# Patient Record
Sex: MALE | Race: WHITE | Employment: OTHER | URBAN - METROPOLITAN AREA
[De-identification: names, ages, dates, MRNs, and addresses within clinical notes are randomized per-mention and may not be internally consistent; named-entity substitution may affect disease eponyms.]

---

## 2017-03-15 DIAGNOSIS — D52.8 OTHER FOLATE DEFICIENCY ANEMIAS: ICD-10-CM

## 2017-03-16 RX ORDER — FOLIC ACID 1 MG/1
1 TABLET ORAL DAILY
Qty: 30 TABLET | Refills: 0 | Status: SHIPPED | OUTPATIENT
Start: 2017-03-16

## 2017-03-16 NOTE — TELEPHONE ENCOUNTER
Medication is being filled for 1 time refill only due to:  Patient needs to be seen because needs to est care with a new provider.

## 2017-03-16 NOTE — TELEPHONE ENCOUNTER
Folic Acid 1MG Tablet      Last Written Prescription Date: 02/01/2016  Last Fill Quantity: 100,  # refills: 3   Last Office Visit with [facility], [facility] or [facility] prescribing provider: 10/31/2016

## 2017-04-17 DIAGNOSIS — D52.8 OTHER FOLATE DEFICIENCY ANEMIAS: ICD-10-CM

## 2017-04-17 RX ORDER — FOLIC ACID 1 MG/1
TABLET ORAL
Qty: 30 TABLET | Refills: 0 | OUTPATIENT
Start: 2017-04-17

## 2017-04-17 NOTE — TELEPHONE ENCOUNTER
Pending Prescriptions:                       Disp   Refills    folic acid (FOLVITE) 1 MG tablet [Pharmac*30 tab*0            Sig: TAKE 1 TABLET BY MOUTH ONCE DAILY        LAST OFFICE VISIT A YEAR AGO 4/20/2016      Last Written Prescription Date: 3/16/2017  Last Fill Quantity: 30,  # refills: 0   Last Office Visit with [facility], [facility] or [facility] prescribing provider: 4/20/2016Luciano

## 2017-04-17 NOTE — TELEPHONE ENCOUNTER
Pt. [facility]. Refill not needed.    Folic Acid      Last Written Prescription Date: 3/16/2017  Last Fill Quantity: 30,  # refills: 0   Last Office Visit with [facility], [facility] or [facility] prescribing provider: 4/20/2016     Routing refill request to provider for review/approval because:  Paola given x1 and patient did not follow up, please advise    Sandra Nye RN, BSN, PHN

## 2018-12-14 ENCOUNTER — HOSPITAL ENCOUNTER (EMERGENCY)
Facility: CLINIC | Age: 68
Discharge: HOME | End: 2018-12-14
Attending: EMERGENCY MEDICINE | Admitting: EMERGENCY MEDICINE
Payer: MEDICARE

## 2018-12-14 VITALS
TEMPERATURE: 97.7 F | HEIGHT: 71 IN | BODY MASS INDEX: 26.6 KG/M2 | SYSTOLIC BLOOD PRESSURE: 142 MMHG | DIASTOLIC BLOOD PRESSURE: 102 MMHG | HEART RATE: 89 BPM | WEIGHT: 190 LBS | RESPIRATION RATE: 14 BRPM | OXYGEN SATURATION: 91 %

## 2018-12-14 DIAGNOSIS — T78.3XXA ANGIOEDEMA, INITIAL ENCOUNTER: ICD-10-CM

## 2018-12-14 PROCEDURE — 96374 THER/PROPH/DIAG INJ IV PUSH: CPT

## 2018-12-14 PROCEDURE — 96375 TX/PRO/DX INJ NEW DRUG ADDON: CPT | Mod: 59

## 2018-12-14 PROCEDURE — 31575 DIAGNOSTIC LARYNGOSCOPY: CPT

## 2018-12-14 PROCEDURE — 96372 THER/PROPH/DIAG INJ SC/IM: CPT | Mod: 59

## 2018-12-14 PROCEDURE — 25000128 H RX IP 250 OP 636: Performed by: EMERGENCY MEDICINE

## 2018-12-14 PROCEDURE — 99284 EMERGENCY DEPT VISIT MOD MDM: CPT | Mod: 25

## 2018-12-14 RX ORDER — EPINEPHRINE 1 MG/ML
0.3 INJECTION, SOLUTION, CONCENTRATE INTRAVENOUS ONCE
Status: COMPLETED | OUTPATIENT
Start: 2018-12-14 | End: 2018-12-14

## 2018-12-14 RX ORDER — DIPHENHYDRAMINE HYDROCHLORIDE 50 MG/ML
25 INJECTION INTRAMUSCULAR; INTRAVENOUS ONCE
Status: COMPLETED | OUTPATIENT
Start: 2018-12-14 | End: 2018-12-14

## 2018-12-14 RX ORDER — LIDOCAINE HYDROCHLORIDE 20 MG/ML
INJECTION, SOLUTION INFILTRATION; PERINEURAL
Status: DISCONTINUED
Start: 2018-12-14 | End: 2018-12-14 | Stop reason: HOSPADM

## 2018-12-14 RX ORDER — METHYLPREDNISOLONE SODIUM SUCCINATE 125 MG/2ML
125 INJECTION, POWDER, LYOPHILIZED, FOR SOLUTION INTRAMUSCULAR; INTRAVENOUS ONCE
Status: COMPLETED | OUTPATIENT
Start: 2018-12-14 | End: 2018-12-14

## 2018-12-14 RX ORDER — OXYMETAZOLINE HYDROCHLORIDE 0.05 G/100ML
SPRAY NASAL
Status: DISCONTINUED
Start: 2018-12-14 | End: 2018-12-14 | Stop reason: HOSPADM

## 2018-12-14 RX ADMIN — RANITIDINE HYDROCHLORIDE 50 MG: 25 INJECTION INTRAMUSCULAR; INTRAVENOUS at 11:04

## 2018-12-14 RX ADMIN — DIPHENHYDRAMINE HYDROCHLORIDE 25 MG: 50 INJECTION, SOLUTION INTRAMUSCULAR; INTRAVENOUS at 11:03

## 2018-12-14 RX ADMIN — METHYLPREDNISOLONE SODIUM SUCCINATE 125 MG: 125 INJECTION, POWDER, FOR SOLUTION INTRAMUSCULAR; INTRAVENOUS at 11:01

## 2018-12-14 RX ADMIN — EPINEPHRINE 0.3 MG: 1 INJECTION INTRAMUSCULAR; INTRAVENOUS; SUBCUTANEOUS at 11:00

## 2018-12-14 ASSESSMENT — ENCOUNTER SYMPTOMS
FACIAL SWELLING: 1
SPEECH DIFFICULTY: 1
TROUBLE SWALLOWING: 1
SORE THROAT: 1
FEVER: 0
SHORTNESS OF BREATH: 0

## 2018-12-14 ASSESSMENT — MIFFLIN-ST. JEOR: SCORE: 1653.96

## 2018-12-14 NOTE — ED NOTES
Pt states no change in pain, and swelling. VSS. Skin pink warm and dry. Wife at bedside. Denies the need for pain medication.

## 2018-12-14 NOTE — ED PROVIDER NOTES
"  History     Chief Complaint:  Oral swelling    HPI   Doug Spence is a 68 year old male smoker who presents with oral swelling. The patient had a cataract surgery this morning and shortly afterwards around 0930 he started feeling like the right side of his tongue was swollen. The swelling has gotten progressively worse, and now he reports that it is difficult to swallow or talk. He also reports a sore throat. The patient is in no respiratory distress. He does not have a rash. The patient did not have a fever when it was measured prior to the surgery. He has no history of any oral swelling like this.     Allergies:  No known allergies     Medications:    Lisinopril  Viagra     Past Medical History:    HTN  Cataract  Tobacco use    Past Surgical History:    PE tubes  T & A  Removal of sperm ducts  Cataract surgery    Family History:    HTN  Diabetes    Social History:  Patient presents with wife  Current smoker   Positive for alcohol use.   Marital Status:       Review of Systems   Constitutional: Negative for fever.   HENT: Positive for facial swelling, sore throat and trouble swallowing.    Respiratory: Negative for shortness of breath.    Skin: Negative for rash.   Neurological: Positive for speech difficulty.   All other systems reviewed and are negative.    Physical Exam   First Vitals:  BP: (!) 171/103  Pulse: 89  Heart Rate: 90  Temp: 97.7  F (36.5  C)  Resp: 13  Height: 180.3 cm (5' 11\")  Weight: 86.2 kg (190 lb)  SpO2: 96 %      Physical Exam  Constitutional:  Appears well-developed and well-nourished. Alert. Conversant. Non toxic.  HENT:   Head: Atraumatic.   Nose: Nose normal.  Mouth/Throat: Significant swelling of his tongue, in particular on the right side with almost no swelling on the left.  He also says swelling of the submandibular tissue more predominant on the right than the left.  He is able to open his mouth to get about 3 cm between his incisors.  Unable to see the soft palate in " the very top of the peritonsillar pillars but not the bottom of the uvula or the tonsils.  He has difficulty speaking due to his tongue swelling.  No stridor or difficulty breathing.  Voice is not hoarse.  He is controlling his own secretions.  Eyes: Conjunctivae normal. EOM normal. Pupils equal, round, and reactive to light. No scleral icterus.   Neck: Normal range of motion. Neck supple. No tracheal deviation present.   Cardiovascular: Normal rate, regular rhythm. No gallop. No friction rub. No murmur heard. Symmetric radial artery pulses   Pulmonary/Chest: Effort normal. No stridor. No respiratory distress. No wheezes. No rales. No rhonchi . No tenderness.   Abdominal: Soft. Bowel sounds normal. No distension. No mass. No tenderness. No rebound. No guarding.   Musculoskeletal:   RUE: Normal range of motion. No tenderness. No deformity  LUE: Normal range of motion. No tenderness. No deformity  RLE: Normal range of motion. No edema. No tenderness. No deformity  LLE: Normal range of motion. No edema. No tenderness. No deformity  Lymph: No cervical adenopathy.   Neurological: Alert and oriented to person, place, and time. Normal strength. CN II-VII intact. No sensory deficit. GCS eye subscore is 4. GCS verbal subscore is 5. GCS motor subscore is 6. Normal coordination   Skin: Skin is warm and dry. No rash noted. No pallor. Normal capillary refill.  Psychiatric:  Normal mood. Normal affect.     Emergency Department Course   Procedures:  Procedure: Nasopharyngoscopy    sing fiberoptic flexible laryngoscope with video screen at bedside.  Indication: Angioedema with tongue swelling, assess for posterior airway swelling or laryngeal swelling  Procedure: Verbal consent was obtained from the patient and his wife.  I evaluated the patient's nostrils.  I felt that his right nostril was suitable.    Topical anesthesia with 2% lidocaine 3 mL's through mucosal at a moderate his device was instilled into the right nostril.   Topical vasoconstrictor with 0.05% Afrin 2 sprays into the right nostril.    Using the slim fiberoptic flexible scope we were able to move to the patient's right nostril.  It took 4 attempts to get a good view that was free of secretions.  We were able to visualize the patient's epiglottis which looked normal and the vocal cords which looked normal.    Patient tolerated the procedure well.    Interventions:  1100 - Epinephrine 0.3 mg IV  1101 - Solu-medrol 125 mg IV  1103 - Benadryl 25 mg IV  1104 - Zantac 50 mg IV    Emergency Department Course:  Nursing notes and vitals reviewed.  1055: I performed an exam of the patient as documented above.     1115: I rechecked the patient. I performed a Nasopharyngoscopy procedure (see procedure note)    1227: I rechecked the patient. Explained findings to patient.    1319: I rechecked the patient. He is starting to feel better and is objectively less swollen.     1400: I rechecked the patient. Findings and plan explained to the Patient. Patient discharged home with instructions regarding supportive care, medications, and reasons to return. The importance of close follow-up was reviewed.       Impression & Plan      Medical Decision Making:  Doug Spence is a 68 year old male who came to the ER today by private car for evaluation of tongue and some tubular swelling, more prominent on the right than on the left.  Swelling occurred while he was on his way home from [facility].  Initial presentation was most suggestive of angioedema of the tongue.  We considered allergic reaction but he had no other symptoms such as bronchospasm, hives, GI effects.  We did administer steroids, antihistamines, epinephrine in case there was some allergic component but he really did not have any change after those medications.  We did fiberoptic flexible nasal laryngoscopy to make sure there is no edema of his airway structures and they were  normal.  We monitor the patient carefully here in the ER with multiple rechecks.  He was stable for a couple of hours and then had slow improvement with decrease in the swelling.  He had objective decrease in swelling improvement in the appearance of his tongue no signs of any airway difficulty.  I discussed with the patient and his wife that it often takes hours to days for angioedema to resolve and we typically would admit some of the hospital for monitoring until symptoms are improved.  He is however feeling better and does not want to be hospitalized.  Therefore we will discharge home.  Clinically, this does seem to be that he is on the appropriate clinical track and expect progression to resolution.  However I reiterated the importance to return to the ER if he feels as though he is worsening at all.     We also discussed that the etiology of the angioedema is not clear but he is on lisinopril.  I recommend that he stop that until he can talk with his doctors.  He and his wife verbalized understanding and agree.  We will have him monitor his blood pressure at home and follow-up with his doctor to see if we should initiate an alternative antihypertensive.      Critical Care time:  0    Diagnosis:    ICD-10-CM    1. Angioedema, initial encounter T78.3XXA        Disposition:  discharged to home    IDaquan, am serving as a scribe on 12/14/2018 at 2:02 PM to personally document services performed by Jayden Haji MD based on my observations and the provider's statements to me.       Daquan Mendez  12/14/2018   [facility] EMERGENCY DEPARTMENT       Jayden Haji MD  12/14/18 7205

## 2018-12-14 NOTE — ED AVS SNAPSHOT
[facility] Emergency Department  [address]  [facility] [address]  Phone:  [phone]  Fax:  [phone]                                    Doug Spence   MRN: [id number]    Department:  [facility] Emergency Department   Date of Visit:  12/14/2018           After Visit Summary Signature Page    I have received my discharge instructions, and my questions have been answered. I have discussed any challenges I see with this plan with the nurse or doctor.    ..........................................................................................................................................  Patient/Patient Representative Signature      ..........................................................................................................................................  Patient Representative Print Name and Relationship to Patient    ..................................................               ................................................  Date                                   Time    ..........................................................................................................................................  Reviewed by Signature/Title    ...................................................              ..............................................  Date                                               Time          22EPIC Rev 08/18

## 2018-12-14 NOTE — DISCHARGE INSTRUCTIONS
Do not take lisinopril until you can talk to your doctor.  I suspect that this tongue swelling was a side effect of that blood pressure medication.    Monitor your blood pressure once daily and keep a record for your doctor so they can help start you on new blood pressure medications.    Continue your drops for your eye surgery as directed by your eye surgeon.    If you have any worsening swelling of your tongue, your chin, or any trouble breathing, or if you develop any rash or hives, to return to the ER immediately.

## 2019-03-22 ENCOUNTER — HEALTH MAINTENANCE LETTER (OUTPATIENT)
Age: 69
End: 2019-03-22

## 2019-09-27 ENCOUNTER — HEALTH MAINTENANCE LETTER (OUTPATIENT)
Age: 69
End: 2019-09-27

## 2019-11-11 ENCOUNTER — HOSPITAL ENCOUNTER (EMERGENCY)
Facility: CLINIC | Age: 69
Discharge: HOME | End: 2019-11-11
Attending: EMERGENCY MEDICINE | Admitting: EMERGENCY MEDICINE
Payer: COMMERCIAL

## 2019-11-11 ENCOUNTER — APPOINTMENT (OUTPATIENT)
Dept: CT IMAGING | Facility: CLINIC | Age: 69
End: 2019-11-11
Attending: EMERGENCY MEDICINE
Payer: COMMERCIAL

## 2019-11-11 VITALS
OXYGEN SATURATION: 96 % | DIASTOLIC BLOOD PRESSURE: 108 MMHG | RESPIRATION RATE: 18 BRPM | TEMPERATURE: 98.4 F | HEART RATE: 107 BPM | SYSTOLIC BLOOD PRESSURE: 167 MMHG

## 2019-11-11 DIAGNOSIS — S06.0X0A CONCUSSION WITHOUT LOSS OF CONSCIOUSNESS, INITIAL ENCOUNTER: ICD-10-CM

## 2019-11-11 DIAGNOSIS — R27.0 ATAXIA: ICD-10-CM

## 2019-11-11 DIAGNOSIS — F10.930 ALCOHOL WITHDRAWAL SYNDROME WITHOUT COMPLICATION (H): ICD-10-CM

## 2019-11-11 LAB
ALBUMIN SERPL-MCNC: 4.1 G/DL (ref 3.4–5)
ALP SERPL-CCNC: 69 U/L (ref 40–150)
ALT SERPL W P-5'-P-CCNC: 72 U/L (ref 0–70)
ANION GAP SERPL CALCULATED.3IONS-SCNC: 9 MMOL/L (ref 3–14)
AST SERPL W P-5'-P-CCNC: 76 U/L (ref 0–45)
BASOPHILS # BLD AUTO: 0.1 10E9/L (ref 0–0.2)
BASOPHILS NFR BLD AUTO: 0.8 %
BILIRUB SERPL-MCNC: 0.7 MG/DL (ref 0.2–1.3)
BUN SERPL-MCNC: 13 MG/DL (ref 7–30)
CALCIUM SERPL-MCNC: 9.4 MG/DL (ref 8.5–10.1)
CHLORIDE SERPL-SCNC: 101 MMOL/L (ref 94–109)
CO2 SERPL-SCNC: 26 MMOL/L (ref 20–32)
CREAT SERPL-MCNC: 0.82 MG/DL (ref 0.66–1.25)
DIFFERENTIAL METHOD BLD: ABNORMAL
EOSINOPHIL # BLD AUTO: 0.3 10E9/L (ref 0–0.7)
EOSINOPHIL NFR BLD AUTO: 3 %
ERYTHROCYTE [DISTWIDTH] IN BLOOD BY AUTOMATED COUNT: 14.4 % (ref 10–15)
ETHANOL SERPL-MCNC: <0.01 G/DL
GFR SERPL CREATININE-BSD FRML MDRD: 90 ML/MIN/{1.73_M2}
GLUCOSE SERPL-MCNC: 130 MG/DL (ref 70–99)
HCT VFR BLD AUTO: 46.7 % (ref 40–53)
HGB BLD-MCNC: 16.9 G/DL (ref 13.3–17.7)
IMM GRANULOCYTES # BLD: 0.1 10E9/L (ref 0–0.4)
IMM GRANULOCYTES NFR BLD: 0.6 %
INTERPRETATION ECG - MUSE: NORMAL
LIPASE SERPL-CCNC: 99 U/L (ref 73–393)
LYMPHOCYTES # BLD AUTO: 1.5 10E9/L (ref 0.8–5.3)
LYMPHOCYTES NFR BLD AUTO: 14.9 %
MAGNESIUM SERPL-MCNC: 1.7 MG/DL (ref 1.6–2.3)
MCH RBC QN AUTO: 38.1 PG (ref 26.5–33)
MCHC RBC AUTO-ENTMCNC: 36.2 G/DL (ref 31.5–36.5)
MCV RBC AUTO: 105 FL (ref 78–100)
MONOCYTES # BLD AUTO: 1.2 10E9/L (ref 0–1.3)
MONOCYTES NFR BLD AUTO: 12.7 %
NEUTROPHILS # BLD AUTO: 6.6 10E9/L (ref 1.6–8.3)
NEUTROPHILS NFR BLD AUTO: 68 %
NRBC # BLD AUTO: 0 10*3/UL
NRBC BLD AUTO-RTO: 0 /100
PLATELET # BLD AUTO: 203 10E9/L (ref 150–450)
POTASSIUM SERPL-SCNC: 3.2 MMOL/L (ref 3.4–5.3)
PROT SERPL-MCNC: 8.2 G/DL (ref 6.8–8.8)
RBC # BLD AUTO: 4.44 10E12/L (ref 4.4–5.9)
SODIUM SERPL-SCNC: 136 MMOL/L (ref 133–144)
WBC # BLD AUTO: 9.7 10E9/L (ref 4–11)

## 2019-11-11 PROCEDURE — 70450 CT HEAD/BRAIN W/O DYE: CPT

## 2019-11-11 PROCEDURE — 83735 ASSAY OF MAGNESIUM: CPT | Performed by: EMERGENCY MEDICINE

## 2019-11-11 PROCEDURE — 80320 DRUG SCREEN QUANTALCOHOLS: CPT | Performed by: EMERGENCY MEDICINE

## 2019-11-11 PROCEDURE — 80053 COMPREHEN METABOLIC PANEL: CPT | Performed by: EMERGENCY MEDICINE

## 2019-11-11 PROCEDURE — 85025 COMPLETE CBC W/AUTO DIFF WBC: CPT | Performed by: EMERGENCY MEDICINE

## 2019-11-11 PROCEDURE — 96374 THER/PROPH/DIAG INJ IV PUSH: CPT

## 2019-11-11 PROCEDURE — 93005 ELECTROCARDIOGRAM TRACING: CPT

## 2019-11-11 PROCEDURE — 25000128 H RX IP 250 OP 636: Performed by: EMERGENCY MEDICINE

## 2019-11-11 PROCEDURE — 99285 EMERGENCY DEPT VISIT HI MDM: CPT | Mod: 25

## 2019-11-11 PROCEDURE — 83690 ASSAY OF LIPASE: CPT | Performed by: EMERGENCY MEDICINE

## 2019-11-11 RX ORDER — CHLORDIAZEPOXIDE HYDROCHLORIDE 5 MG/1
10 CAPSULE, GELATIN COATED ORAL 3 TIMES DAILY PRN
Qty: 20 CAPSULE | Refills: 0 | Status: SHIPPED | OUTPATIENT
Start: 2019-11-11

## 2019-11-11 RX ORDER — ONDANSETRON 2 MG/ML
4 INJECTION INTRAMUSCULAR; INTRAVENOUS ONCE
Status: COMPLETED | OUTPATIENT
Start: 2019-11-11 | End: 2019-11-11

## 2019-11-11 RX ORDER — LORAZEPAM 2 MG/ML
1 INJECTION INTRAMUSCULAR ONCE
Status: DISCONTINUED | OUTPATIENT
Start: 2019-11-11 | End: 2019-11-11 | Stop reason: HOSPADM

## 2019-11-11 RX ORDER — ONDANSETRON 4 MG/1
4 TABLET, ORALLY DISINTEGRATING ORAL EVERY 6 HOURS PRN
Qty: 10 TABLET | Refills: 0 | Status: SHIPPED | OUTPATIENT
Start: 2019-11-11 | End: 2019-11-14

## 2019-11-11 RX ADMIN — ONDANSETRON HYDROCHLORIDE 4 MG: 2 INJECTION, SOLUTION INTRAMUSCULAR; INTRAVENOUS at 08:33

## 2019-11-11 ASSESSMENT — ENCOUNTER SYMPTOMS
DIZZINESS: 1
HEADACHES: 1
TREMORS: 1
NECK PAIN: 0

## 2019-11-11 NOTE — ED NOTES
Pt refusing MRI, requesting to go home. Pt ensures will follow up with primary care doctor. MD discussed importance of follow up with patient in presence of RN.
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
## 2019-11-11 NOTE — ED AVS SNAPSHOT
[facility] Emergency Department  [address]  [facility] [address]  Phone:  [phone]  Fax:  [phone]                                    Doug Spence   MRN: [id number]    Department:  [facility] Emergency Department   Date of Visit:  11/11/2019           After Visit Summary Signature Page    I have received my discharge instructions, and my questions have been answered. I have discussed any challenges I see with this plan with the nurse or doctor.    ..........................................................................................................................................  Patient/Patient Representative Signature      ..........................................................................................................................................  Patient Representative Print Name and Relationship to Patient    ..................................................               ................................................  Date                                   Time    ..........................................................................................................................................  Reviewed by Signature/Title    ...................................................              ..............................................  Date                                               Time          22EPIC Rev 08/18
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
## 2020-03-15 ENCOUNTER — HEALTH MAINTENANCE LETTER (OUTPATIENT)
Age: 70
End: 2020-03-15

## 2020-12-13 ENCOUNTER — HOSPITAL ENCOUNTER (EMERGENCY)
Facility: CLINIC | Age: 70
Discharge: HOME | End: 2020-12-13
Attending: EMERGENCY MEDICINE | Admitting: EMERGENCY MEDICINE
Payer: COMMERCIAL

## 2020-12-13 VITALS
DIASTOLIC BLOOD PRESSURE: 93 MMHG | RESPIRATION RATE: 16 BRPM | OXYGEN SATURATION: 99 % | TEMPERATURE: 98.6 F | HEART RATE: 105 BPM | SYSTOLIC BLOOD PRESSURE: 142 MMHG

## 2020-12-13 DIAGNOSIS — R33.9 URINARY RETENTION: ICD-10-CM

## 2020-12-13 DIAGNOSIS — D64.9 ANEMIA, UNSPECIFIED TYPE: ICD-10-CM

## 2020-12-13 DIAGNOSIS — E87.1 HYPONATREMIA: ICD-10-CM

## 2020-12-13 LAB
ALBUMIN UR-MCNC: NEGATIVE MG/DL
ANION GAP SERPL CALCULATED.3IONS-SCNC: 4 MMOL/L (ref 3–14)
APPEARANCE UR: CLEAR
BASOPHILS # BLD AUTO: 0 10E9/L (ref 0–0.2)
BASOPHILS NFR BLD AUTO: 0.6 %
BILIRUB UR QL STRIP: NEGATIVE
BUN SERPL-MCNC: 12 MG/DL (ref 7–30)
CALCIUM SERPL-MCNC: 9.5 MG/DL (ref 8.5–10.1)
CHLORIDE SERPL-SCNC: 94 MMOL/L (ref 94–109)
CO2 SERPL-SCNC: 33 MMOL/L (ref 20–32)
COLOR UR AUTO: NORMAL
CREAT SERPL-MCNC: 0.51 MG/DL (ref 0.66–1.25)
DIFFERENTIAL METHOD BLD: ABNORMAL
EOSINOPHIL # BLD AUTO: 0 10E9/L (ref 0–0.7)
EOSINOPHIL NFR BLD AUTO: 0.1 %
ERYTHROCYTE [DISTWIDTH] IN BLOOD BY AUTOMATED COUNT: 13.2 % (ref 10–15)
GFR SERPL CREATININE-BSD FRML MDRD: >90 ML/MIN/{1.73_M2}
GLUCOSE SERPL-MCNC: 126 MG/DL (ref 70–99)
GLUCOSE UR STRIP-MCNC: NEGATIVE MG/DL
HCT VFR BLD AUTO: 31.1 % (ref 40–53)
HGB BLD-MCNC: 11.4 G/DL (ref 13.3–17.7)
HGB UR QL STRIP: NEGATIVE
IMM GRANULOCYTES # BLD: 0.1 10E9/L (ref 0–0.4)
IMM GRANULOCYTES NFR BLD: 2 %
KETONES UR STRIP-MCNC: NEGATIVE MG/DL
LEUKOCYTE ESTERASE UR QL STRIP: NEGATIVE
LYMPHOCYTES # BLD AUTO: 0.5 10E9/L (ref 0.8–5.3)
LYMPHOCYTES NFR BLD AUTO: 7.8 %
MCH RBC QN AUTO: 41.5 PG (ref 26.5–33)
MCHC RBC AUTO-ENTMCNC: 36.7 G/DL (ref 31.5–36.5)
MCV RBC AUTO: 113 FL (ref 78–100)
MONOCYTES # BLD AUTO: 0.2 10E9/L (ref 0–1.3)
MONOCYTES NFR BLD AUTO: 3.2 %
NEUTROPHILS # BLD AUTO: 6 10E9/L (ref 1.6–8.3)
NEUTROPHILS NFR BLD AUTO: 86.3 %
NITRATE UR QL: NEGATIVE
NRBC # BLD AUTO: 0 10*3/UL
NRBC BLD AUTO-RTO: 0 /100
PH UR STRIP: 7 PH (ref 5–7)
PLATELET # BLD AUTO: 350 10E9/L (ref 150–450)
POTASSIUM SERPL-SCNC: 4.6 MMOL/L (ref 3.4–5.3)
RBC # BLD AUTO: 2.75 10E12/L (ref 4.4–5.9)
RBC #/AREA URNS AUTO: 2 /HPF (ref 0–2)
SODIUM SERPL-SCNC: 131 MMOL/L (ref 133–144)
SOURCE: NORMAL
SP GR UR STRIP: 1.01 (ref 1–1.03)
UROBILINOGEN UR STRIP-MCNC: NORMAL MG/DL (ref 0–2)
WBC # BLD AUTO: 7 10E9/L (ref 4–11)
WBC #/AREA URNS AUTO: 1 /HPF (ref 0–5)

## 2020-12-13 PROCEDURE — 81001 URINALYSIS AUTO W/SCOPE: CPT | Performed by: EMERGENCY MEDICINE

## 2020-12-13 PROCEDURE — 80048 BASIC METABOLIC PNL TOTAL CA: CPT | Performed by: EMERGENCY MEDICINE

## 2020-12-13 PROCEDURE — 36415 COLL VENOUS BLD VENIPUNCTURE: CPT | Performed by: EMERGENCY MEDICINE

## 2020-12-13 PROCEDURE — 85025 COMPLETE CBC W/AUTO DIFF WBC: CPT | Performed by: EMERGENCY MEDICINE

## 2020-12-13 PROCEDURE — 99283 EMERGENCY DEPT VISIT LOW MDM: CPT

## 2020-12-13 PROCEDURE — 51702 INSERT TEMP BLADDER CATH: CPT

## 2020-12-13 ASSESSMENT — ENCOUNTER SYMPTOMS
DIARRHEA: 1
VOMITING: 0
HEMATURIA: 0
FEVER: 0
DIFFICULTY URINATING: 1

## 2020-12-13 NOTE — ED PROVIDER NOTES
History     Chief Complaint:  Difficulty Urinating       The history is provided by the patient.      Doug Spence is a 70 year old male we a history of Hodgkin's lymphoma and hypertension who presents for evaluation of urinary retention since last night. The patient states that he has had urgency with difficulty urinating, being unable to urinate. He states that he was able to urinate well until last night. He notes no history of urinary issues and states that he does not have a urologist. The patient denies any fever, vomiting or hematuria. He notes no history of abdominal surgeries and is not currently anticoagulated. He notes no recent antibiotic use. He notes that he has had some diarrhea, which he states that this is from the medication he is taking for Hodgkin's lymphoma. He states that he is currently completing chemotherapy, however is unsure what medications he is currently taking. He notes that [facility]. He states that since then he has been weak and unsteady on his feet but notes no other concerns.     Allergies:  No Known Drug Allergies      Medications:    Librium  Folvite  Lisinopril  Viagra    Past Medical History:    Hypertension  Hodgkin's lymphoma    Past Surgical History:    Extracapsular cataract extraction with intraocular lens implant  Colonoscopy through stoma, diagnostic  PE tubes  Tonsillectomy and adenoidectomy  Removal of sperm ducts     Family History:    Hypertension    Social History:  The patient presents to the ED alone.  PCP: [facility]     Review of Systems   Constitutional: Negative for fever.   Gastrointestinal: Positive for diarrhea. Negative for vomiting.   Genitourinary: Positive for decreased urine volume, difficulty urinating and urgency. Negative for hematuria.   All other systems reviewed and are negative.    Physical Exam     Patient Vitals for the past 24 hrs:    BP Temp Pulse Resp SpO2   12/13/20 1608 (!) 142/93 98.6  F (37  C) 105 16 98 %       Physical Exam  General: Sitting up in bed  Eyes:  The pupils are equal and round    Conjunctivae and sclerae are normal  ENT:    Wearing a mask  Neck:  Normal range of motion  CV:  Tachycardic rate, regular rhythm    Skin warm and well perfused   Resp:  Non labored breathing on room air    No tachypnea    No cough heard  GI:  Abdomen is soft, there is no rigidity    No distension    No rebound tenderness     Tender on suprapubic area  MS:  Normal muscular tone  Skin:  No rash or acute skin lesions noted  Neuro:   Awake, alert.      Speech is normal and fluent.    Face is symmetric.     Moves all extremities equally  Psych: Normal affect.  Appropriate interactions.    Emergency Department Course     Laboratory:  Laboratory findings were communicated with the patient who voiced understanding of the findings.    CBC: WBC: 7.0, HGB: 11.4 (low), PLT: 350    BMP: Glucose 126 (high), Sodium 131 (low), CO2 33 (low), Creatinine 0.51 (low) o/w WNL     UA with Microscopic: Negative     Emergency Department Course:  Past medical records, nursing notes, and vitals reviewed.    1616 I performed an exam of the patient as documented above.     IV was inserted and blood was drawn for laboratory testing, results above.    The patient provided a urine sample here in the emergency department. This was sent for laboratory testing, findings above.    1700 Nursing staff informed me that the patient urinated 1,400mL after catheter placement. The patient feels improved at this time, per nursing staff.     1800 I rechecked the patient and discussed the results of his workup thus far. Patient feels much better    Findings and plan explained to the patient. Patient discharged home with instructions regarding supportive care, medications, and reasons to return. The importance of close follow-up was reviewed.     I personally reviewed the laboratory results with  the patient and answered all related questions prior to discharge.     Impression & Plan     Medical Decision Making:  Doug Spence is a 70 year old male with a history of Hodgkin's lymphoma who presents for evaluation of decreased urinary output.  I considered a broad differential including diverticulitis, aneurysm, urinary retention, ureterolithiasis, UTI, pyelonephritis, neurologic causes (MS, cauda equina,etc), colitis, etc.  The history and exam are consistent with acute urinary retention and this is confirmed after ramírez catheter placement. The patient released 1,4000mL of urine after ramírez catheter placement and felt improved after this. A urinalysis was obtained and was negative. Has mild hyponatremia and mild anemia which was discussed with patient. Will send the patient home with catheter and have patient followup with urology. [facility] - he will talk to his doctor about getting into urology.  The cause of the acute urinary retention is unclear at this point and considered that this may be caused by current medications, prostate issues, bladder or urethral tumor, etc.  The patient understood the plan for urology follow up and return precautions and was discharged home in good condition. All questions answered.    Diagnosis:    ICD-10-CM    1. Urinary retention  R33.9 UA with Microscopic     Basic metabolic panel     CBC with platelets differential   2. Hyponatremia  E87.1    3. Anemia, unspecified type  D64.9        Disposition:  Discharged to home.    Scribe Disclosure:  I, Prasanth Howard, am serving as a scribe at 4:16 PM on 12/13/2020 to document services personally performed by Iva Flores MD based on my observations and the provider's statements to me.      Iva Flores MD  12/13/20 2924

## 2020-12-13 NOTE — ED AVS SNAPSHOT
[facility] Emergency Dept  [address]  [facility] [address]  Phone: [phone]  Fax: [phone]                                    Doug Spence   MRN: [id number]    Department: [facility] Emergency Dept   Date of Visit: 12/13/2020           After Visit Summary Signature Page    I have received my discharge instructions, and my questions have been answered. I have discussed any challenges I see with this plan with the nurse or doctor.    ..........................................................................................................................................  Patient/Patient Representative Signature      ..........................................................................................................................................  Patient Representative Print Name and Relationship to Patient    ..................................................               ................................................  Date                                   Time    ..........................................................................................................................................  Reviewed by Signature/Title    ...................................................              ..............................................  Date                                               Time          22EPIC Rev 08/18

## 2021-02-17 ENCOUNTER — HOSPITAL ENCOUNTER (EMERGENCY)
Facility: CLINIC | Age: 71
Discharge: HOME | End: 2021-02-17
Attending: EMERGENCY MEDICINE | Admitting: EMERGENCY MEDICINE
Payer: COMMERCIAL

## 2021-02-17 VITALS
BODY MASS INDEX: 24.5 KG/M2 | DIASTOLIC BLOOD PRESSURE: 90 MMHG | SYSTOLIC BLOOD PRESSURE: 140 MMHG | TEMPERATURE: 97.2 F | OXYGEN SATURATION: 96 % | HEART RATE: 84 BPM | RESPIRATION RATE: 16 BRPM | WEIGHT: 175 LBS | HEIGHT: 71 IN

## 2021-02-17 DIAGNOSIS — E86.0 DEHYDRATION: ICD-10-CM

## 2021-02-17 DIAGNOSIS — T83.511A URINARY TRACT INFECTION ASSOCIATED WITH INDWELLING URETHRAL CATHETER, INITIAL ENCOUNTER (H): ICD-10-CM

## 2021-02-17 DIAGNOSIS — N48.89 PENILE PAIN: ICD-10-CM

## 2021-02-17 DIAGNOSIS — N39.0 URINARY TRACT INFECTION ASSOCIATED WITH INDWELLING URETHRAL CATHETER, INITIAL ENCOUNTER (H): ICD-10-CM

## 2021-02-17 LAB
ALBUMIN SERPL-MCNC: 3 G/DL (ref 3.4–5)
ALBUMIN UR-MCNC: 100 MG/DL
ALP SERPL-CCNC: 75 U/L (ref 40–150)
ALT SERPL W P-5'-P-CCNC: 73 U/L (ref 0–70)
ANION GAP SERPL CALCULATED.3IONS-SCNC: 9 MMOL/L (ref 3–14)
APPEARANCE UR: ABNORMAL
AST SERPL W P-5'-P-CCNC: 35 U/L (ref 0–45)
BACTERIA #/AREA URNS HPF: ABNORMAL /HPF
BASOPHILS # BLD AUTO: 0 10E9/L (ref 0–0.2)
BASOPHILS NFR BLD AUTO: 0.5 %
BILIRUB SERPL-MCNC: 0.5 MG/DL (ref 0.2–1.3)
BILIRUB UR QL STRIP: NEGATIVE
BUN SERPL-MCNC: 14 MG/DL (ref 7–30)
CALCIUM SERPL-MCNC: 9.1 MG/DL (ref 8.5–10.1)
CHLORIDE SERPL-SCNC: 99 MMOL/L (ref 94–109)
CO2 SERPL-SCNC: 25 MMOL/L (ref 20–32)
COLOR UR AUTO: YELLOW
CREAT SERPL-MCNC: 0.7 MG/DL (ref 0.66–1.25)
DIFFERENTIAL METHOD BLD: ABNORMAL
EOSINOPHIL # BLD AUTO: 0 10E9/L (ref 0–0.7)
EOSINOPHIL NFR BLD AUTO: 0.5 %
ERYTHROCYTE [DISTWIDTH] IN BLOOD BY AUTOMATED COUNT: 15.9 % (ref 10–15)
GFR SERPL CREATININE-BSD FRML MDRD: >90 ML/MIN/{1.73_M2}
GLUCOSE SERPL-MCNC: 108 MG/DL (ref 70–99)
GLUCOSE UR STRIP-MCNC: NEGATIVE MG/DL
HCT VFR BLD AUTO: 29.2 % (ref 40–53)
HGB BLD-MCNC: 10.6 G/DL (ref 13.3–17.7)
HGB UR QL STRIP: ABNORMAL
HYALINE CASTS #/AREA URNS LPF: 20 /LPF (ref 0–2)
IMM GRANULOCYTES # BLD: 0.1 10E9/L (ref 0–0.4)
IMM GRANULOCYTES NFR BLD: 1 %
INTERPRETATION ECG - MUSE: NORMAL
KETONES UR STRIP-MCNC: NEGATIVE MG/DL
LACTATE BLD-SCNC: 2.2 MMOL/L (ref 0.7–2)
LACTATE BLD-SCNC: 3.2 MMOL/L (ref 0.7–2)
LEUKOCYTE ESTERASE UR QL STRIP: ABNORMAL
LYMPHOCYTES # BLD AUTO: 0.8 10E9/L (ref 0.8–5.3)
LYMPHOCYTES NFR BLD AUTO: 12.6 %
MCH RBC QN AUTO: 41.1 PG (ref 26.5–33)
MCHC RBC AUTO-ENTMCNC: 36.3 G/DL (ref 31.5–36.5)
MCV RBC AUTO: 113 FL (ref 78–100)
MONOCYTES # BLD AUTO: 0.7 10E9/L (ref 0–1.3)
MONOCYTES NFR BLD AUTO: 10.6 %
MUCOUS THREADS #/AREA URNS LPF: PRESENT /LPF
NEUTROPHILS # BLD AUTO: 4.6 10E9/L (ref 1.6–8.3)
NEUTROPHILS NFR BLD AUTO: 74.8 %
NITRATE UR QL: POSITIVE
NRBC # BLD AUTO: 0 10*3/UL
NRBC BLD AUTO-RTO: 0 /100
PH UR STRIP: 7 PH (ref 5–7)
PLATELET # BLD AUTO: 224 10E9/L (ref 150–450)
POTASSIUM SERPL-SCNC: 3.8 MMOL/L (ref 3.4–5.3)
PROT SERPL-MCNC: 6.4 G/DL (ref 6.8–8.8)
RBC # BLD AUTO: 2.58 10E12/L (ref 4.4–5.9)
RBC #/AREA URNS AUTO: 69 /HPF (ref 0–2)
SODIUM SERPL-SCNC: 133 MMOL/L (ref 133–144)
SOURCE: ABNORMAL
SP GR UR STRIP: 1.01 (ref 1–1.03)
SQUAMOUS #/AREA URNS AUTO: 1 /HPF (ref 0–1)
UROBILINOGEN UR STRIP-MCNC: NORMAL MG/DL (ref 0–2)
WBC # BLD AUTO: 6.1 10E9/L (ref 4–11)
WBC #/AREA URNS AUTO: >182 /HPF (ref 0–5)
WBC CLUMPS #/AREA URNS HPF: PRESENT /HPF

## 2021-02-17 PROCEDURE — 93005 ELECTROCARDIOGRAM TRACING: CPT

## 2021-02-17 PROCEDURE — 96360 HYDRATION IV INFUSION INIT: CPT

## 2021-02-17 PROCEDURE — 80053 COMPREHEN METABOLIC PANEL: CPT | Performed by: EMERGENCY MEDICINE

## 2021-02-17 PROCEDURE — 250N000013 HC RX MED GY IP 250 OP 250 PS 637: Mod: GY | Performed by: EMERGENCY MEDICINE

## 2021-02-17 PROCEDURE — 85025 COMPLETE CBC W/AUTO DIFF WBC: CPT | Performed by: EMERGENCY MEDICINE

## 2021-02-17 PROCEDURE — 87186 SC STD MICRODIL/AGAR DIL: CPT | Performed by: EMERGENCY MEDICINE

## 2021-02-17 PROCEDURE — 258N000003 HC RX IP 258 OP 636: Performed by: EMERGENCY MEDICINE

## 2021-02-17 PROCEDURE — 87086 URINE CULTURE/COLONY COUNT: CPT | Performed by: EMERGENCY MEDICINE

## 2021-02-17 PROCEDURE — 96361 HYDRATE IV INFUSION ADD-ON: CPT

## 2021-02-17 PROCEDURE — 99284 EMERGENCY DEPT VISIT MOD MDM: CPT | Mod: 25

## 2021-02-17 PROCEDURE — 83605 ASSAY OF LACTIC ACID: CPT | Mod: 91 | Performed by: EMERGENCY MEDICINE

## 2021-02-17 PROCEDURE — 81001 URINALYSIS AUTO W/SCOPE: CPT | Performed by: EMERGENCY MEDICINE

## 2021-02-17 PROCEDURE — 87088 URINE BACTERIA CULTURE: CPT | Performed by: EMERGENCY MEDICINE

## 2021-02-17 RX ORDER — LIDOCAINE HYDROCHLORIDE 20 MG/ML
20 JELLY TOPICAL
Status: DISCONTINUED | OUTPATIENT
Start: 2021-02-17 | End: 2021-02-17 | Stop reason: HOSPADM

## 2021-02-17 RX ORDER — CIPROFLOXACIN 500 MG/1
500 TABLET, FILM COATED ORAL ONCE
Status: COMPLETED | OUTPATIENT
Start: 2021-02-17 | End: 2021-02-17

## 2021-02-17 RX ORDER — CIPROFLOXACIN 500 MG/1
500 TABLET, FILM COATED ORAL 2 TIMES DAILY
Qty: 10 TABLET | Refills: 0 | Status: SHIPPED | OUTPATIENT
Start: 2021-02-17 | End: 2021-02-22

## 2021-02-17 RX ORDER — SODIUM CHLORIDE 9 MG/ML
INJECTION, SOLUTION INTRAVENOUS CONTINUOUS
Status: DISCONTINUED | OUTPATIENT
Start: 2021-02-17 | End: 2021-02-17 | Stop reason: HOSPADM

## 2021-02-17 RX ADMIN — CIPROFLOXACIN HYDROCHLORIDE 500 MG: 500 TABLET, FILM COATED ORAL at 13:56

## 2021-02-17 RX ADMIN — SODIUM CHLORIDE 1000 ML: 9 INJECTION, SOLUTION INTRAVENOUS at 10:54

## 2021-02-17 RX ADMIN — SODIUM CHLORIDE 500 ML: 9 INJECTION, SOLUTION INTRAVENOUS at 12:15

## 2021-02-17 ASSESSMENT — ENCOUNTER SYMPTOMS
ABDOMINAL PAIN: 1
HEMATURIA: 1
BRUISES/BLEEDS EASILY: 1
SHORTNESS OF BREATH: 0
BACK PAIN: 0
APPETITE CHANGE: 1
BLOOD IN STOOL: 0
NAUSEA: 1
FEVER: 0
DIZZINESS: 1
LIGHT-HEADEDNESS: 1

## 2021-02-17 ASSESSMENT — MIFFLIN-ST. JEOR: SCORE: 1575.92

## 2021-02-17 NOTE — ED NOTES
Patient got up and ambulated. Patient states he usually does not use a walker or cane but her normally does have a brace for his right leg which is currently not here. Patient states he feels improved after the fluids. Patient's wife states she normally helps will hold on to patient while he is ambulating. MD made aware.

## 2021-02-17 NOTE — ED TRIAGE NOTES
Pt c/o blood in his urine. Pt had urinary retention 12/20 and a ramírez was placed. [facility]. Pt has an appointment with urology tomorrow. C/o burning pain in penis. States urine output is the same as it has been.

## 2021-02-17 NOTE — DISCHARGE INSTRUCTIONS
Discharge Instructions  Urinary Tract Infection  You have been diagnosed with a likely urinary tract infection, or UTI. The urinary tract includes the kidneys (which make urine/pee), ureters (the tubes that carry urine/pee from the kidneys to the bladder), the bladder (which stores urine/pee), and urethra (the tube that carries urine/pee out of the bladder). Urinary tract infections occur when bacteria travel up the urethra into the bladder (bladder infection) and, in some cases, from there into the kidneys (kidney infection).  Generally, every Emergency Department visit should have a follow-up clinic visit with either a primary or a specialty clinic/provider. Please follow-up as instructed by your emergency provider today.  Return to the Emergency Department if:  You or your child have severe back pain.  You or your child are vomiting (throwing up) so that you cannot take your medicine.  You or your child have a new fever (had not previously had a fever) over 101 F.  You or your child have confusion or are very weak, or feel very ill.  Your child seems much more ill, will not wake up, will not respond right, or is crying for a long time and will not calm down.  You or your child are showing signs of dehydration. These signs may include decreased urination (pee), dry mouth/gums/tongue, or decreased activity.    Follow-up with your provider:   Children under 24 months need to be seen by their regular provider within one week after a diagnosis of a UTI. It may be necessary to do some more tests to look at the child s kidney or bladder.  You should begin to feel better within 24 - 48 hours of starting your antibiotic; follow-up with your regular clinic/doctor/provider if this is not the case.    Treatment:   You will be treated with an antibiotic to kill the bacteria. We have to make an educated guess, based on what we know about common bacteria and antibiotics, as to which antibiotic will work for your infection. We  "will be correct most times but there will be some cases where the antibiotic chosen is not correct (see urine cultures below).  Take a pain medication such as acetaminophen (Tylenol ) or ibuprofen (Advil , Motrin , Nuprin ).  Phenazopyridine (Pyridium , Uristat ) is a prescription medication that numbs the bladder to reduce the burning pain of some UTIs.  The same medication is available in a non-prescription version (Azo-Standard , Urodol ). This medication will change the color of the urine and tears (usually blue or orange). If you wear contacts, do not wear them while taking this medication as they may be stained by the medication.    Urine Cultures:  If indicated, a urine culture may have been performed today. This test generally takes 24-48 hours to complete so the results are not known at this time. The results can confirm that an infection is present but also determine which antibiotic is effective for the specific bacteria that is causing the infection. If your urine culture shows that the antibiotic you were given today will not work to treat your infection, we will attempt to contact you to make arrangements to change the antibiotic. If the culture confirms that the antibiotic is effective for your infection, you will not be contacted. We often recommend follow-up with your regular physician/provider on the culture results regardless of this process.    Antibiotic Warning:   If you have been placed on antibiotics - watch for signs of allergic reaction.  These include rash, lip swelling, difficulty breathing, wheezing, and dizziness.  If you develop any of these symptoms, stop the antibiotic immediately and go to an emergency room or urgent care for evaluation.    Probiotics: If you have been given an antibiotic, you may want to also take a probiotic pill or eat yogurt with live cultures. Probiotics have \"good bacteria\" to help your intestines stay healthy. Studies have shown that probiotics help prevent " diarrhea and other intestine problems (including C. diff infection) when you take antibiotics. You can buy these without a prescription in the pharmacy section of the store.   If you were given a prescription for medicine here today, be sure to read all of the information (including the package insert) that comes with your prescription.  This will include important information about the medicine, its side effects, and any warnings that you need to know about.  The pharmacist who fills the prescription can provide more information and answer questions you may have about the medicine.  If you have questions or concerns that the pharmacist cannot address, please call or return to the Emergency Department.   Remember that you can always come back to the Emergency Department if you are not able to see your regular provider in the amount of time listed above, if you get any new symptoms, or if there is anything that worries you.

## 2021-02-17 NOTE — ED PROVIDER NOTES
"  History   Chief Complaint:  Hematuria       The history is provided by the patient.      Doug Spence is a 70 year old male with history of hypertension who presents with Hematuria. States pain in his penis has progressed since his ramírez catheter was placed (12/12/20) . His ramírez was changed once in the ending of January. He notes recently he had to \"fix it\" by taping it on his right thigh. He notes he thought he saw blood in the bag earlier, as it was darker and reddish, but denies clots. He does not he bruises easily but denies any other abnormal bleeding. He notes slight abdominal pain over his bladder, lightheadedness, nausea , dizziness, and diarrhea since taking vitamin supplements . He has no appetite or thirst, reporting that he has really not had anything to eat or drink over the last few days. He notes he has not slept well either. Denies chest pain, shortness of breath, fever, or back pain, or black and bloody stools. He [facility] and wanted to go to the ED there today but his wife didn't want to drive further.     Review of Systems   Constitutional: Positive for appetite change. Negative for fever.   Respiratory: Negative for shortness of breath.    Cardiovascular: Positive for leg swelling. Negative for chest pain.   Gastrointestinal: Positive for abdominal pain and nausea. Negative for blood in stool.        No bloody stool.   Genitourinary: Positive for hematuria.   Musculoskeletal: Negative for back pain.   Neurological: Positive for dizziness and light-headedness.   Hematological: Bruises/bleeds easily.   All other systems reviewed and are negative.      Allergies:  No known allergies    Medications:  Viagra  Librium   Cyanoblamin    Folvite  Lisinopril     Past Medical History:    Hypertension   Nonsenile cataract      Past Surgical History:    Extracapsular cataract Extraction with intralocular lens implant  HC create eardrum opening  Remove " "tonsils/adenoids  Removal of septum ducts     Family History:    Mother: hypertension     Social History:  PCP:  [place]  Presents with significant other.    Physical Exam     Patient Vitals for the past 24 hrs:   BP Temp Temp src Pulse Resp SpO2 Height Weight   02/17/21 1345 -- -- -- 84 -- 96 % -- --   02/17/21 1330 (!) 140/90 -- -- 81 -- 96 % -- --   02/17/21 1315 (!) 137/92 -- -- 84 -- 96 % -- --   02/17/21 1300 (!) 162/122 -- -- 85 -- 97 % -- --   02/17/21 1245 (!) 146/89 -- -- 79 -- 97 % -- --   02/17/21 1230 (!) 146/87 -- -- 80 -- 95 % -- --   02/17/21 1215 (!) 143/87 -- -- 84 -- -- -- --   02/17/21 1210 -- -- -- 75 -- -- -- --   02/17/21 1200 -- -- -- 69 -- 98 % -- --   02/17/21 1150 129/64 -- -- 79 -- 99 % -- --   02/17/21 1145 -- -- -- 78 -- 99 % -- --   02/17/21 1140 -- -- -- 75 -- 100 % -- --   02/17/21 1130 119/83 -- -- 76 -- 98 % -- --   02/17/21 1120 138/85 -- -- 71 -- 98 % -- --   02/17/21 1115 138/85 -- -- 74 -- 99 % -- --   02/17/21 1110 -- -- -- 70 -- 98 % -- --   02/17/21 1100 -- -- -- -- -- 100 % -- --   02/17/21 1045 -- -- -- -- -- 98 % -- --   02/17/21 1040 -- -- -- -- -- 97 % -- --   02/17/21 1030 121/81 -- -- 84 -- 100 % -- --   02/17/21 1009 (!) 85/53 97.2  F (36.2  C) Temporal 104 16 100 % 1.803 m (5' 11\") 79.4 kg (175 lb)       Physical Exam  General: Elderly male sitting upright  Eyes: PERRL, Conjunctive within normal limits  ENT: Moist mucous membranes, oropharynx clear.   CV: Normal S1S2, no murmur, rub or gallop. Tachycardic, regular.   Resp: Clear to auscultation bilaterally, no wheezes, rales or rhonchi. Normal respiratory effort.  GI: Abdomen is soft and nondistended. Mild suprapubic tenderness to palpation. No palpable masses. No rebound or guarding. No CVA tenderness to percussion.  : Curry catheter in place, held taut by taping onto the leg with, tugging at the urethral meatus. No visible blood or discharge at the urethral meatus, which is mildly " erythematous but without skin breakdown. Dark yellow urine in tubing and bag. No gross hematuria.   MSK: No edema. Nontender. Normal active range of motion.  Skin: Warm and dry. No rashes or lesions or ecchymoses on visible skin.  Neuro: Alert and oriented. Responds appropriately to all questions and commands. No focal findings appreciated. Normal muscle tone.  Psych: Normal mood and affect.     Emergency Department Course     ECG:  ECG taken at 1057, ECG read at 1107  Unusual P axis, possible ectopic atrial rhythm With premature atrial complexes  Abnormal  ECG  Rate 76 bpm. NC interval 158 ms. QRS duration 94 ms. QT/QTc 402/452 ms. P-R-T axes 148 28 26.    Laboratory:  CBC: WBC 6.1, HGB 10.6(L),    CMP: Glucose  108(H), Albumin  3.0(L), protein 6.4(L), ALT 73(H), Creatinine 0.70 o/w WNL  Lactic acid whole blood(1104) 3.2(H)  UA with microscopic: blood moderate, protein Albumin 100, nitrate positive, leukocyte esterase  Large, WBC/HPF  >182(H), RBC/HPF  69(H), WBC clumps present, bacteria many, mucosus present, hyaline casts 20(H)  o/w WNL    Urine Culture Aerobic Bacterial: pending  Lactic acid whole blood(1104) 3.2(H)  Lactic acid whole blood(1155) 2.2(H)    Emergency Department Course:    Reviewed:  I reviewed nursing notes, vitals and past medical history    Assessments:  1016 I obtained history and examined the patient as noted above.   1041 I rechecked the patient and explained findings.   1326 I returned to recheck the patient and explain findings.     Interventions:  1054 NS, 1 L, IV  1215 NS, 500mL, IV    Disposition:  The patient was discharged to home.     Impression & Plan       Medical Decision Making:  Doug Spence is a 70-year-old male with recent urinary retention status post placement of Curry catheter which is still in place with concerns for blood noted in the urine.  Is not clear if it is actually blood or just concentrated urine versus UTI.  He was having burning at the urethral  meatus urethra.  The Curry catheter was tugging strongly on the penis when I evaluated the patient suggesting this might be irritative.  I also considered Curry catheter associated UTI.  He was tachycardic and had one initial low blood pressure measurement in triage.  He had no further low blood pressure measurements.  His tachycardia improved with IV fluids.  His symptoms of dizziness and weakness improved with IV fluids.  He was still feeling tired and fatigued but had no fever, is nontoxic in appearance and I do not suspect systemic infection at this time.  His lactic acid was elevated, improved with IV fluids, I suspect secondary to dehydration given his history.  It is possible the urinary changes are secondary to contamination rather than true UTI however given the overall clinical picture we will treat him with ciprofloxacin while awaiting urology follow-up tomorrow.  He felt comfortable this plan.  He was somewhat shaky on ambulation trial but noted that that he was tired and that was somewhat his baseline as did his wife.  She will be assisting him at home.  He is cautioned guarding falls.  He declined a cane or walker here.  He is recommend return immediately to the emergency department should his symptoms worsen.  He felt comfortable with plan for discharge home.      Diagnosis:    ICD-10-CM    1. Penile pain  N48.89    2. Urinary tract infection associated with indwelling urethral catheter, initial encounter (H)  T83.511A     N39.0    3. Dehydration  E86.0        Discharge Medications:  New Prescriptions    CIPROFLOXACIN (CIPRO) 500 MG TABLET    Take 1 tablet (500 mg) by mouth 2 times daily for 5 days       Scribe Disclosure:  I, Biankamouna Arroyo, am serving as a scribe at 10:21 AM on 2/17/2021 to document services personally performed by Nina Cooper based on my observations and the provider's statements to me.        Nina Cooper MD  02/17/21 4559

## 2021-02-20 LAB
BACTERIA SPEC CULT: ABNORMAL
Lab: ABNORMAL
SPECIMEN SOURCE: ABNORMAL

## 2021-05-08 ENCOUNTER — HEALTH MAINTENANCE LETTER (OUTPATIENT)
Age: 71
End: 2021-05-08

## 2021-10-23 ENCOUNTER — HEALTH MAINTENANCE LETTER (OUTPATIENT)
Age: 71
End: 2021-10-23

## 2022-06-04 ENCOUNTER — HEALTH MAINTENANCE LETTER (OUTPATIENT)
Age: 72
End: 2022-06-04

## 2022-10-09 ENCOUNTER — HEALTH MAINTENANCE LETTER (OUTPATIENT)
Age: 72
End: 2022-10-09

## 2023-06-10 ENCOUNTER — HEALTH MAINTENANCE LETTER (OUTPATIENT)
Age: 73
End: 2023-06-10